# Patient Record
Sex: MALE | Race: AMERICAN INDIAN OR ALASKA NATIVE
[De-identification: names, ages, dates, MRNs, and addresses within clinical notes are randomized per-mention and may not be internally consistent; named-entity substitution may affect disease eponyms.]

---

## 2018-01-16 ENCOUNTER — HOSPITAL ENCOUNTER (EMERGENCY)
Dept: HOSPITAL 74 - FER | Age: 18
Discharge: HOME | End: 2018-01-16
Payer: COMMERCIAL

## 2018-01-16 VITALS — SYSTOLIC BLOOD PRESSURE: 144 MMHG | DIASTOLIC BLOOD PRESSURE: 92 MMHG | TEMPERATURE: 98.2 F | HEART RATE: 108 BPM

## 2018-01-16 VITALS — BODY MASS INDEX: 31.3 KG/M2

## 2018-01-16 DIAGNOSIS — G44.209: ICD-10-CM

## 2018-01-16 DIAGNOSIS — R07.89: Primary | ICD-10-CM

## 2018-01-16 NOTE — PDOC
History of Present Illness





- General


Chief Complaint: Lightheaded


Stated Complaint: LIGHTHEADED, CHEST PAIN


Time Seen by Provider: 01/16/18 01:06





- History of Present Illness


Initial Comments: 





This 17-year-old with a history of "gastric ulcer", diagnosed last year and 

treated with amoxicillin with "another antibiotic" followed by omeprazole 

presents with chest pain and headache, accompanied by some lightheadedness for 

the last several hours.  Patient states that he has been taking his omeprazole 

irregularly and also has been eating foods that have previously caused some 

increase in his gastric symptoms (for example, eggs/highly spiced foods/coffee)

.  Today, he has been bothered by bitemporal headache (patient describes 

headache pain in a circular band distribution around his head).  This evening, 

he noted discomfort in his chest; patient states that this discomfort is 

similar to the dyspepsia pain that he has had with his gastric issue.  He 

denies shortness of breath/palpitations/diaphoresis/nausea. 


 He has no vision changes/photophobia, acute neurologic deficits, vomiting 

associated with his headache.  No history of head trauma.  Patient states that 

he did not take any medication for his headache since he "does not like taking 

medications"





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


Cardiac risk factors: Positive family history for coronary artery disease; no 

smoking/obesity/hyperlipidemia/hypertension/diabetes mellitus














Past History





- Past Medical History


Allergies/Adverse Reactions: 


 Allergies











Allergy/AdvReac Type Severity Reaction Status Date / Time


 


No Known Allergies Allergy   Verified 01/16/18 01:21











Home Medications: 


Ambulatory Orders





Omeprazole 20 mg PO DAILY 01/16/18 











**Review of Systems





- Review of Systems


Able to Perform ROS?: Yes


Comments:: 





12 point review of systems is negative except for what is noted in the history 

of present illness








*Physical Exam





- Physical Exam


Comments: 





GENERAL: Adolescent male, alert and oriented 3, appearing anxious but in no 

acute distress


HEAD: Normal with no signs of trauma.


EYES: PERRLA, EOMI, sclera anicteric, conjunctiva clear.


ENT: Ears normal, nares patent, oropharynx clear without exudates.  Moist 

mucous membranes.


NECK: Normal range of motion, supple without lymphadenopathy, JVD, or masses.


LUNGS: Breath sounds equal, clear to auscultation bilaterally.  No wheezes, and 

no crackles.


HEART:Regular rate and rhythm, normal S1 and S2 without murmur, rub or gallop.


ABDOMEN:.normal bowel sounds  No guarding,tenderness or rebound.No masses No 

distention. 


EXTREMITIES: Normal range of motion, no edema.  No clubbing or cyanosis. No 

erythema, or tenderness.


NEUROLOGICAL: Cranial nerves II through XII grossly intact.  Normal speech.  No 

focal 


neurological deficits. 


MUSCULOSKELETAL:  Back non-tender to palpation, no CVA tenderness


SKIN: Warm, Dry, normal turgor, no rashes or lesions noted.








**Heart Score/ECG Review





- History


History: Slightly suspicious





- Electrocardiogram


EKG: Normal





- Age


Age: </= 45





- Risk Factors


Risk Factors Heart Score: Yes Positive family hx of cardiac disease


Based on the list above the patient has:: 1-2 risk factors





Medical Decision Making





- Medical Decision Making





This 17-year-old young man presents with several complaints: Bitemporal 

headache consistent with tension headache and substernal chest discomfort.  By 

the patient's own admission, his chest pain is similar to what he experienced 

prior to diagnosis of gastritis/gastric ulcer/GERD/H. pylori last year.  He has 

not been compliant with his omeprazole and diet has also been problematic.  He 

also admits to being "stressed", worrying about returning to college in 4 days.

  His only risk factor for coronary artery disease is family history.





Patient reassured regarding likelihood that chest pain is likely related to his 

GI issues.


Factors that could be related to his pain and to his anxiety included coffee 

intake: The patient will decrease the amount of coffee he drinks daily.  Also, 

he will take his omeprazole daily as prescribed and follow-up with his general 

doctor  and his gastroenterologist prior to return to school


Patient was given acetaminophen 650 mg now with advice to continue this as 

needed for headache





*DC/Admit/Observation/Transfer


Diagnosis at time of Disposition: 


 Atypical chest pain, Tension headache








- Discharge Dispostion


Disposition: HOME


Condition at time of disposition: Stable





- Referrals





- Patient Instructions


Printed Discharge Instructions:  Tension Headache (Alternative Therapy), DI for 

Atypical Chest Pain


Additional Instructions: 


Continue omeprazole as prescribed


Avoid coffee; avoid high fat/high acidity in your diet


Try to get regular exercise every day (such as walking)


Acetaminophen as needed for headache


Follow-up with your PCP within the next 3-4 days


Return to ER if you have severe chest pain/shortness of breath/palpitations





- Post Discharge Activity